# Patient Record
Sex: FEMALE
[De-identification: names, ages, dates, MRNs, and addresses within clinical notes are randomized per-mention and may not be internally consistent; named-entity substitution may affect disease eponyms.]

---

## 2018-04-13 ENCOUNTER — HOSPITAL ENCOUNTER (EMERGENCY)
Dept: HOSPITAL 42 - ED | Age: 28
Discharge: HOME | End: 2018-04-13
Payer: MEDICAID

## 2018-04-13 VITALS
RESPIRATION RATE: 18 BRPM | DIASTOLIC BLOOD PRESSURE: 60 MMHG | HEART RATE: 89 BPM | SYSTOLIC BLOOD PRESSURE: 114 MMHG | OXYGEN SATURATION: 96 % | TEMPERATURE: 97.8 F

## 2018-04-13 VITALS — BODY MASS INDEX: 25.6 KG/M2

## 2018-04-13 DIAGNOSIS — L50.9: Primary | ICD-10-CM

## 2018-04-13 NOTE — ED PDOC
Arrival/HPI





- General


Chief Complaint: Abnormal Skin Integrity


Time Seen by Provider: 04/13/18 21:27


Historian: Patient





- History of Present Illness


Narrative History of Present Illness (Text): 





04/13/18 22:06


26yo female with no Past medical history present with PMHx present with one 

week history of pruritic hives. Notes that she usually gets the rash, whenever 

she gets tp her house in the evenings. She denies any new lotion/detergent/food/

clothe/medication, any other complaint. She denies tongue swelling, shortness 

of breath chest pain, neck pain, any other complaint.





Past Medical History





- Provider Review


Nursing Documentation Reviewed: Yes





- Infectious Disease


Hx of Infectious Diseases: None





- Cardiac


Hx Cardiac Disorders: No





- Pulmonary


Hx Respiratory Disorders: No





- Neurological


Hx Neurological Disorder: No





- HEENT


Hx HEENT Disorder: No





- Renal


Hx Renal Disorder: No





- Endocrine/Metabolic


Hx Endocrine Disorders: No





- Hematological/Oncological


Hx Blood Disorders: No





- Integumentary


Hx Dermatological Disorder: No





- Musculoskeletal/Rheumatological


Hx Musculoskeletal Disorders: No





- Gastrointestinal


Hx Gastrointestinal Disorders: No





- Genitourinary/Gynecological


Hx Genitourinary Disorders: No





- Psychiatric


Hx Psychophysiologic Disorder: No


Hx Anxiety: No


Hx Bipolar Disorder: No


Hx Depression: No


Hx Emotional Abuse: No


Hx Hallucinations: No


Hx Panic Disorder: No


Hx Post Traumatic Stress Disorder: No


Hx Psychosis: No


Hx Physical Abuse: No


Hx Schizophrenia: No


Hx Sexual Abuse: No


Hx Substance Use: No





- Anesthesia


Hx Anesthesia: No


Hx Anesthesia Reactions: No


Hx Malignant Hyperthermia: No





Family/Social History





- Physician Review


Nursing Documentation Reviewed: Yes


Family/Social History: Unknown Family HX


Smoking Status: Never Smoked


Hx Alcohol Use: No


Hx Substance Use: No





Allergies/Home Meds


Allergies/Adverse Reactions: 


Allergies





amoxicillin Allergy (Verified 04/13/18 21:19)


 ANAPHYLAXIS











Review of Systems





- Physician Review


All systems were reviewed & negative as marked: Yes





- Review of Systems


Constitutional: Normal


Eyes: Normal


ENT: Normal


Respiratory: Normal


Cardiovascular: Normal


Gastrointestinal: Normal


Genitourinary Female: Normal


Musculoskeletal: Normal


Skin: Rash, Pruritis


Neurological: Normal


Endocrine: Normal


Hemo/Lymphatic: Normal


Psychiatric: Normal





Physical Exam


Vital Signs Reviewed: Yes


Vital Signs











  Temp Pulse Resp BP Pulse Ox


 


 04/13/18 21:19  97.8 F  89  18  114/60  96











Temperature: Afebrile


Blood Pressure: Normal


Pulse: Regular


Respiratory Rate: Normal


Appearance: Positive for: Well-Appearing, Non-Toxic, Comfortable


Pain Distress: None


Mental Status: Positive for: Alert and Oriented X 3





- Systems Exam


Head: Present: Atraumatic, Normocephalic


Pupils: Present: PERRL


Extroacular Muscles: Present: EOMI


Conjunctiva: Present: Normal


Mouth: Present: Moist Mucous Membranes.  No: Drooling


Pharnyx: No: Strider


Neck: Present: Normal Range of Motion


Respiratory/Chest: Present: Clear to Auscultation, Good Air Exchange.  No: 

Respiratory Distress, Accessory Muscle Use


Cardiovascular: Present: Regular Rate and Rhythm, Normal S1, S2.  No: Murmurs


Abdomen: No: Tenderness, Distention, Peritoneal Signs


Back: Present: Normal Inspection


Upper Extremity: Present: Normal Inspection.  No: Cyanosis, Edema


Lower Extremity: Present: Normal Inspection.  No: Edema


Neurological: Present: GCS=15, CN II-XII Intact, Speech Normal


Skin: Present: Warm, Dry, Rashes (Erythematous hives noted on abdominal wall 

and lower back), Normal Color


Psychiatric: Present: Alert, Oriented x 3, Normal Insight, Normal Concentration





Medical Decision Making


ED Course and Treatment: 





04/14/18 17:39


PT presented for stated history. She was not in any distress. No stridor noted. 

She was treated and DC home with Benadryl, pepcid and Prednisone. referred to 

allergist/Dermatologist.





- Medication Orders


Current Medication Orders: 











Discontinued Medications





Diphenhydramine HCl (Benadryl)  25 mg PO STAT STA


   Stop: 04/13/18 21:59


   Last Admin: 04/13/18 22:31  Dose: 25 mg





Famotidine (Pepcid)  20 mg PO STAT STA


   Stop: 04/13/18 21:59


   Last Admin: 04/13/18 22:31  Dose: 20 mg





Prednisone (Prednisone Tab)  40 mg PO STAT STA


   Stop: 04/13/18 21:59


   Last Admin: 04/13/18 22:31  Dose: 40 mg











Disposition/Present on Arrival





- Present on Arrival


Any Indicators Present on Arrival: No


History of DVT/PE: No


History of Uncontrolled Diabetes: No


Urinary Catheter: No


History of Decub. Ulcer: No


History Surgical Site Infection Following: None





- Disposition


Have Diagnosis and Disposition been Completed?: Yes


Diagnosis: 


 Urticaria





Disposition: HOME/ ROUTINE


Disposition Time: 22:10


Patient Plan: Discharge


Condition: STABLE


Discharge Instructions (ExitCare):  Hives (DC)


Additional Instructions: 


Take medication as directed


Follow up with Allergist/Dermatologist


Return to Emergency department for any new or worsening symptoms


Prescriptions: 


DiphenhydrAMINE [Benadryl] 25 mg PO Q4 #30 cap


Famotidine [Pepcid] 20 mg PO DAILY #12 tab


predniSONE [Prednisone] 20 mg PO BID #6 tab


Referrals: 


Ruby Lawson NP [Primary Care Provider] - Follow up with primary


Manny Albrecht MD [Staff Provider] - Follow up with primary


Forms:  [employer] (English)